# Patient Record
Sex: FEMALE | Race: WHITE
[De-identification: names, ages, dates, MRNs, and addresses within clinical notes are randomized per-mention and may not be internally consistent; named-entity substitution may affect disease eponyms.]

---

## 2019-08-03 ENCOUNTER — HOSPITAL ENCOUNTER (EMERGENCY)
Dept: HOSPITAL 47 - EC | Age: 39
Discharge: HOME | End: 2019-08-03
Payer: MEDICARE

## 2019-08-03 VITALS — TEMPERATURE: 98.4 F

## 2019-08-03 VITALS — SYSTOLIC BLOOD PRESSURE: 126 MMHG | HEART RATE: 85 BPM | DIASTOLIC BLOOD PRESSURE: 90 MMHG | RESPIRATION RATE: 16 BRPM

## 2019-08-03 DIAGNOSIS — Z87.19: ICD-10-CM

## 2019-08-03 DIAGNOSIS — N39.0: Primary | ICD-10-CM

## 2019-08-03 DIAGNOSIS — F17.200: ICD-10-CM

## 2019-08-03 DIAGNOSIS — Z91.048: ICD-10-CM

## 2019-08-03 DIAGNOSIS — Z98.84: ICD-10-CM
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
## 2019-08-03 DIAGNOSIS — S22.32XA: ICD-10-CM

## 2019-08-03 LAB
ALBUMIN SERPL-MCNC: 3.4 G/DL (ref 3.5–5)
ALP SERPL-CCNC: 100 U/L (ref 38–126)
ALT SERPL-CCNC: 50 U/L (ref 9–52)
AMYLASE SERPL-CCNC: 68 U/L (ref 30–110)
ANION GAP SERPL CALC-SCNC: 5 MMOL/L
APTT BLD: 24.4 SEC (ref 22–30)
AST SERPL-CCNC: 96 U/L (ref 14–36)
BASOPHILS # BLD AUTO: 0 K/UL (ref 0–0.2)
BASOPHILS NFR BLD AUTO: 1 %
BUN SERPL-SCNC: 5 MG/DL (ref 7–17)
CALCIUM SPEC-MCNC: 8.7 MG/DL (ref 8.4–10.2)
CHLORIDE SERPL-SCNC: 106 MMOL/L (ref 98–107)
CK SERPL-CCNC: 49 U/L (ref 30–135)
CO2 SERPL-SCNC: 27 MMOL/L (ref 22–30)
EOSINOPHIL # BLD AUTO: 0 K/UL (ref 0–0.7)
EOSINOPHIL NFR BLD AUTO: 1 %
ERYTHROCYTE [DISTWIDTH] IN BLOOD BY AUTOMATED COUNT: 3.69 M/UL (ref 3.8–5.4)
ERYTHROCYTE [DISTWIDTH] IN BLOOD: 16 % (ref 11.5–15.5)
GLUCOSE SERPL-MCNC: 93 MG/DL (ref 74–99)
HCT VFR BLD AUTO: 35.7 % (ref 34–46)
HGB BLD-MCNC: 11.4 GM/DL (ref 11.4–16)
INR PPP: 1 (ref ?–1.2)
LYMPHOCYTES # SPEC AUTO: 0.9 K/UL (ref 1–4.8)
LYMPHOCYTES NFR SPEC AUTO: 29 %
MCH RBC QN AUTO: 30.9 PG (ref 25–35)
MCHC RBC AUTO-ENTMCNC: 32 G/DL (ref 31–37)
MCV RBC AUTO: 96.5 FL (ref 80–100)
MONOCYTES # BLD AUTO: 0.3 K/UL (ref 0–1)
MONOCYTES NFR BLD AUTO: 10 %
NEUTROPHILS # BLD AUTO: 1.9 K/UL (ref 1.3–7.7)
NEUTROPHILS NFR BLD AUTO: 59 %
PH UR: 7 [PH] (ref 5–8)
PLATELET # BLD AUTO: 131 K/UL (ref 150–450)
POTASSIUM SERPL-SCNC: 3.9 MMOL/L (ref 3.5–5.1)
PROT SERPL-MCNC: 6.3 G/DL (ref 6.3–8.2)
PT BLD: 10.8 SEC (ref 9–12)
RBC UR QL: 5 /HPF (ref 0–5)
SODIUM SERPL-SCNC: 138 MMOL/L (ref 137–145)
SP GR UR: 1.01 (ref 1–1.03)
SQUAMOUS UR QL AUTO: 10 /HPF (ref 0–4)
UROBILINOGEN UR QL STRIP: <2 MG/DL (ref ?–2)
WBC # BLD AUTO: 3.2 K/UL (ref 3.8–10.6)

## 2019-08-03 PROCEDURE — 85025 COMPLETE CBC W/AUTO DIFF WBC: CPT

## 2019-08-03 PROCEDURE — 96376 TX/PRO/DX INJ SAME DRUG ADON: CPT

## 2019-08-03 PROCEDURE — 94640 AIRWAY INHALATION TREATMENT: CPT

## 2019-08-03 PROCEDURE — 99285 EMERGENCY DEPT VISIT HI MDM: CPT

## 2019-08-03 PROCEDURE — 96375 TX/PRO/DX INJ NEW DRUG ADDON: CPT

## 2019-08-03 PROCEDURE — 85730 THROMBOPLASTIN TIME PARTIAL: CPT

## 2019-08-03 PROCEDURE — 83690 ASSAY OF LIPASE: CPT

## 2019-08-03 PROCEDURE — 74177 CT ABD & PELVIS W/CONTRAST: CPT

## 2019-08-03 PROCEDURE — 96374 THER/PROPH/DIAG INJ IV PUSH: CPT

## 2019-08-03 PROCEDURE — 80053 COMPREHEN METABOLIC PANEL: CPT

## 2019-08-03 PROCEDURE — 82550 ASSAY OF CK (CPK): CPT

## 2019-08-03 PROCEDURE — 85610 PROTHROMBIN TIME: CPT

## 2019-08-03 PROCEDURE — 83605 ASSAY OF LACTIC ACID: CPT

## 2019-08-03 PROCEDURE — 96361 HYDRATE IV INFUSION ADD-ON: CPT

## 2019-08-03 PROCEDURE — 36415 COLL VENOUS BLD VENIPUNCTURE: CPT

## 2019-08-03 PROCEDURE — 87086 URINE CULTURE/COLONY COUNT: CPT

## 2019-08-03 PROCEDURE — 81001 URINALYSIS AUTO W/SCOPE: CPT

## 2019-08-03 PROCEDURE — 71046 X-RAY EXAM CHEST 2 VIEWS: CPT

## 2019-08-03 PROCEDURE — 82150 ASSAY OF AMYLASE: CPT

## 2019-08-03 NOTE — XR
EXAMINATION TYPE: XR chest 2V

 

DATE OF EXAM: 8/3/2019

 

COMPARISON: NONE

 

HISTORY: Cough.

 

TECHNIQUE:  Frontal and lateral views of the chest are obtained.

 

FINDINGS:  There is no focal air space opacity, pleural effusion, or pneumothorax seen.  The cardiac 
silhouette size is within normal limits. Ossification of shoulder level could reflect product of calc
ific rotator cuff tendinitis. Cholecystectomy clips are suspected on lateral view.

 

IMPRESSION:  No suspicious acute pulmonary process.

## 2019-08-03 NOTE — ED
Abdominal Pain HPI





- General


Chief Complaint: Abdominal Pain


Stated Complaint: abd pain


Time Seen by Provider: 19 13:42


Source: patient, RN notes reviewed


Mode of arrival: wheelchair


Limitations: no limitations





- History of Present Illness


Initial Comments: 





This a 38-year-old female presents emergency Department chief complaint of left-

sided abdominal pain.  Patient states that he started a few days ago she states 

initially fell into a chair.  Patient states that it feels exactly like when she

had pancreatitis in the past.  Patient does admit to nausea vomiting no.  

patient states that she's had slight loose stools no profound diarrhea or co

nstipation no melena or hematochezia.  patient states that she has no current 

chest pain or shortness of breath.  patient states the pain is just unbearable 

at this time.  she does admit that she does use alcohol 3 times a week.  patient

reports no fevers or chills.





- Related Data


                                  Previous Rx's











 Medication  Instructions  Recorded


 


Sulfamethox-Tmp 800-160Mg [Bactrim 1 each PO Q12HR #14 tab 19





Ds]  











                                    Allergies











Allergy/AdvReac Type Severity Reaction Status Date / Time


 


SILK TAPE Allergy  Unknown Uncoded 19 13:38














Review of Systems


ROS Statement: 


Those systems with pertinent positive or pertinent negative responses have been 

documented in the HPI.





ROS Other: All systems not noted in ROS Statement are negative.





Past Medical History


History of Any Multi-Drug Resistant Organisms: None Reported


Past Surgical History: Bariatric Surgery,  Section, Orthopedic Surgery


Past Psychological History: Depression


Smoking Status: Current every day smoker


Past Alcohol Use History: Occasional


Past Drug Use History: Cocaine





General Exam


Limitations: no limitations


General appearance: alert, in no apparent distress


Head exam: Present: atraumatic, normocephalic, normal inspection


Eye exam: Present: normal appearance, PERRL, EOMI.  Absent: scleral icterus, con

junctival injection, periorbital swelling


Respiratory exam: Present: normal lung sounds bilaterally.  Absent: respiratory 

distress, wheezes, rales, rhonchi, stridor


Cardiovascular Exam: Present: regular rate, normal rhythm, normal heart sounds. 

Absent: systolic murmur, diastolic murmur, rubs, gallop, clicks


GI/Abdominal exam: Present: soft, tenderness (Moderate left-sided abdominal 

tenderness), normal bowel sounds.  Absent: distended, guarding, rebound, rigid


Back exam: Present: full ROM, CVA tenderness (L).  Absent: tenderness, CVA 

tenderness (R)


Neurological exam: Present: alert, oriented X3, CN II-XII intact, reflexes 

normal.  Absent: motor sensory deficit


Skin exam: Present: warm, dry, intact, normal color.  Absent: rash





Course


                                   Vital Signs











  19





  13:34 14:59 15:04


 


Temperature 98.4 F  


 


Pulse Rate 98 90 92


 


Respiratory 16  





Rate   


 


Blood Pressure 105/76  


 


O2 Sat by Pulse 97  





Oximetry   














  19





  15:24


 


Temperature 


 


Pulse Rate 


 


Respiratory 18





Rate 


 


Blood Pressure 


 


O2 Sat by Pulse 





Oximetry 














Medical Decision Making





- Medical Decision Making





38-year-old female presented from it with chief complaint of left-sided 

abdominal and rib pain.  Patient rib fracture on CT when I reviewed it.  Patient

has also evidence urinary tract infection no edematous changes of the kidney 

concerning for pyelonephritis.  Patient will be discharged with antibiotics.  

Return parameters were discussed.  Patient states she has pain meds at home.





- Lab Data


Result diagrams: 


                                 19 14:29





                                 19 14:29


                                   Lab Results











  19 Range/Units





  14:29 14:29 14:29 


 


WBC   3.2 L   (3.8-10.6)  k/uL


 


RBC   3.69 L   (3.80-5.40)  m/uL


 


Hgb   11.4   (11.4-16.0)  gm/dL


 


Hct   35.7   (34.0-46.0)  %


 


MCV   96.5   (80.0-100.0)  fL


 


MCH   30.9   (25.0-35.0)  pg


 


MCHC   32.0   (31.0-37.0)  g/dL


 


RDW   16.0 H   (11.5-15.5)  %


 


Plt Count   131 L   (150-450)  k/uL


 


Neutrophils %   59   %


 


Lymphocytes %   29   %


 


Monocytes %   10   %


 


Eosinophils %   1   %


 


Basophils %   1   %


 


Neutrophils #   1.9   (1.3-7.7)  k/uL


 


Lymphocytes #   0.9 L   (1.0-4.8)  k/uL


 


Monocytes #   0.3   (0-1.0)  k/uL


 


Eosinophils #   0.0   (0-0.7)  k/uL


 


Basophils #   0.0   (0-0.2)  k/uL


 


Anisocytosis   Slight   


 


PT     (9.0-12.0)  sec


 


INR     (<1.2)  


 


APTT     (22.0-30.0)  sec


 


Sodium  138    (137-145)  mmol/L


 


Potassium  3.9    (3.5-5.1)  mmol/L


 


Chloride  106    ()  mmol/L


 


Carbon Dioxide  27    (22-30)  mmol/L


 


Anion Gap  5    mmol/L


 


BUN  5 L    (7-17)  mg/dL


 


Creatinine  0.49 L    (0.52-1.04)  mg/dL


 


Est GFR (CKD-EPI)AfAm  >90    (>60 ml/min/1.73 sqM)  


 


Est GFR (CKD-EPI)NonAf  >90    (>60 ml/min/1.73 sqM)  


 


Glucose  93    (74-99)  mg/dL


 


Plasma Lactic Acid Fermin    0.9  (0.7-2.0)  mmol/L


 


Calcium  8.7    (8.4-10.2)  mg/dL


 


Total Bilirubin  0.6    (0.2-1.3)  mg/dL


 


AST  96 H    (14-36)  U/L


 


ALT  50    (9-52)  U/L


 


Alkaline Phosphatase  100    ()  U/L


 


Creatine Kinase  49    ()  U/L


 


Total Protein  6.3    (6.3-8.2)  g/dL


 


Albumin  3.4 L    (3.5-5.0)  g/dL


 


Amylase  68    ()  U/L


 


Lipase  313 H    ()  U/L


 


Urine Color     


 


Urine Appearance     (Clear)  


 


Urine pH     (5.0-8.0)  


 


Ur Specific Gravity     (1.001-1.035)  


 


Urine Protein     (Negative)  


 


Urine Glucose (UA)     (Negative)  


 


Urine Ketones     (Negative)  


 


Urine Blood     (Negative)  


 


Urine Nitrite     (Negative)  


 


Urine Bilirubin     (Negative)  


 


Urine Urobilinogen     (<2.0)  mg/dL


 


Ur Leukocyte Esterase     (Negative)  


 


Urine RBC     (0-5)  /hpf


 


Urine WBC     (0-5)  /hpf


 


Ur Squamous Epith Cells     (0-4)  /hpf


 


Amorphous Sediment     (None)  /hpf


 


Urine Mucus     (None)  /hpf














  19 Range/Units





  14:29 15:26 


 


WBC    (3.8-10.6)  k/uL


 


RBC    (3.80-5.40)  m/uL


 


Hgb    (11.4-16.0)  gm/dL


 


Hct    (34.0-46.0)  %


 


MCV    (80.0-100.0)  fL


 


MCH    (25.0-35.0)  pg


 


MCHC    (31.0-37.0)  g/dL


 


RDW    (11.5-15.5)  %


 


Plt Count    (150-450)  k/uL


 


Neutrophils %    %


 


Lymphocytes %    %


 


Monocytes %    %


 


Eosinophils %    %


 


Basophils %    %


 


Neutrophils #    (1.3-7.7)  k/uL


 


Lymphocytes #    (1.0-4.8)  k/uL


 


Monocytes #    (0-1.0)  k/uL


 


Eosinophils #    (0-0.7)  k/uL


 


Basophils #    (0-0.2)  k/uL


 


Anisocytosis    


 


PT  10.8   (9.0-12.0)  sec


 


INR  1.0   (<1.2)  


 


APTT  24.4   (22.0-30.0)  sec


 


Sodium    (137-145)  mmol/L


 


Potassium    (3.5-5.1)  mmol/L


 


Chloride    ()  mmol/L


 


Carbon Dioxide    (22-30)  mmol/L


 


Anion Gap    mmol/L


 


BUN    (7-17)  mg/dL


 


Creatinine    (0.52-1.04)  mg/dL


 


Est GFR (CKD-EPI)AfAm    (>60 ml/min/1.73 sqM)  


 


Est GFR (CKD-EPI)NonAf    (>60 ml/min/1.73 sqM)  


 


Glucose    (74-99)  mg/dL


 


Plasma Lactic Acid Fermin    (0.7-2.0)  mmol/L


 


Calcium    (8.4-10.2)  mg/dL


 


Total Bilirubin    (0.2-1.3)  mg/dL


 


AST    (14-36)  U/L


 


ALT    (9-52)  U/L


 


Alkaline Phosphatase    ()  U/L


 


Creatine Kinase    ()  U/L


 


Total Protein    (6.3-8.2)  g/dL


 


Albumin    (3.5-5.0)  g/dL


 


Amylase    ()  U/L


 


Lipase    ()  U/L


 


Urine Color   Yellow  


 


Urine Appearance   Cloudy H  (Clear)  


 


Urine pH   7.0  (5.0-8.0)  


 


Ur Specific Gravity   1.008  (1.001-1.035)  


 


Urine Protein   Negative  (Negative)  


 


Urine Glucose (UA)   Negative  (Negative)  


 


Urine Ketones   Negative  (Negative)  


 


Urine Blood   Negative  (Negative)  


 


Urine Nitrite   Negative  (Negative)  


 


Urine Bilirubin   Negative  (Negative)  


 


Urine Urobilinogen   <2.0  (<2.0)  mg/dL


 


Ur Leukocyte Esterase   Large H  (Negative)  


 


Urine RBC   5  (0-5)  /hpf


 


Urine WBC   105 H  (0-5)  /hpf


 


Ur Squamous Epith Cells   10 H  (0-4)  /hpf


 


Amorphous Sediment   Rare H  (None)  /hpf


 


Urine Mucus   Rare H  (None)  /hpf














Disposition


Clinical Impression: 


 Rib fracture, UTI (urinary tract infection)





Disposition: HOME SELF-CARE


Condition: Stable


Instructions (If sedation given, give patient instructions):  Rib Fracture (ED)


Additional Instructions: 


Please return to the Emergency Department if symptoms worsen or any other 

concerns.


Prescriptions: 


Sulfamethox-Tmp 800-160Mg [Bactrim Ds] 1 each PO Q12HR #14 tab


Is patient prescribed a controlled substance at d/c from ED?: No


Referrals: 


Max Berg MD [Primary Care Provider] - 1-2 days


Time of Disposition: 17:00

## 2019-08-03 NOTE — CT
EXAMINATION TYPE: CT abdomen pelvis w con

 

DATE OF EXAM: 8/3/2019

 

HISTORY: LUQ pain. hx of pancreatitis. Left upper quadrant trauma.

 

CT DLP: 964mGycm  Automated Exposure Control for Dose Reduction was Utilized.

 

CONTRAST: 

CT scan of the abdomen and pelvis is performed without oral but with IV Contrast, patient injected wi
th 100 mL of Isovue 300.

 

COMPARISON: None.

 

FINDINGS:

 

LUNG BASES: Dependent atelectasis in both bases.

 

LIVER/GB: Liver is diffusely low dense consistent with marked fatty infiltration.

 

PANCREAS: Pancreas is normal in size without surrounding fluid or fat stranding.

 

SPLEEN: Spleen is enlarged at 13.5 cm long axis axial image 12.

 

ADRENALS: No significant abnormality is seen.

 

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 Symmetric cortical medullary uptake and exc
retion is seen.

 

BOWEL: Evaluation of bowel suboptimal secondary to lack of enteric contrast. Surgical changes from ga
stric bypass noted epigastric region. Additional surgical sutures seen in left upper quadrant bowel l
oop. No suspicious overall small or large bowel dilatation. Normal-appearing appendix from the cecum 
seen best on coronal image 55.

 

UTERUS/ADNEXA: Anteverted uterus is present. Left ovary. Normal size image 70 with suspected 2.5 cm t
hin-walled cyst anteriorly axial image 71. Right ovary not as well seen. Perhaps small amount of flui
d right pelvis coronal image 36.

 

LYMPH NODES: No greater than 1cm abdominal or pelvic lymph nodes are appreciated.

 

OSSEOUS STRUCTURES: No significant abnormality is seen.

 

OTHER: No significant additional abnormality is seen.

 

IMPRESSION: No CT evidence for complication related to acute pancreatitis. No significant acute findi
ng is seen to account for patient's clinical symptoms on this study.

## 2020-08-07 ENCOUNTER — HOSPITAL ENCOUNTER (EMERGENCY)
Dept: HOSPITAL 47 - EC | Age: 40
Discharge: OTHER INSTITUTION | End: 2020-08-07
Payer: MEDICARE

## 2020-08-07 VITALS — SYSTOLIC BLOOD PRESSURE: 114 MMHG | DIASTOLIC BLOOD PRESSURE: 83 MMHG | HEART RATE: 94 BPM | RESPIRATION RATE: 30 BRPM

## 2020-08-07 VITALS — TEMPERATURE: 98.5 F

## 2020-08-07 DIAGNOSIS — F17.200: ICD-10-CM

## 2020-08-07 DIAGNOSIS — Z91.048: ICD-10-CM

## 2020-08-07 DIAGNOSIS — Z79.891: ICD-10-CM

## 2020-08-07 DIAGNOSIS — F32.9: ICD-10-CM

## 2020-08-07 DIAGNOSIS — Z98.890: ICD-10-CM

## 2020-08-07 DIAGNOSIS — M00.822: ICD-10-CM

## 2020-08-07 DIAGNOSIS — Z79.899: ICD-10-CM

## 2020-08-07 DIAGNOSIS — A41.9: ICD-10-CM

## 2020-08-07 DIAGNOSIS — E87.2: ICD-10-CM

## 2020-08-07 DIAGNOSIS — T81.49XA: Primary | ICD-10-CM

## 2020-08-07 DIAGNOSIS — R00.0: ICD-10-CM

## 2020-08-07 DIAGNOSIS — T81.44XA: ICD-10-CM

## 2020-08-07 LAB
ALBUMIN SERPL-MCNC: 2.6 G/DL (ref 3.5–5)
ALP SERPL-CCNC: 159 U/L (ref 38–126)
ALT SERPL-CCNC: 17 U/L (ref 4–34)
ANION GAP SERPL CALC-SCNC: 7 MMOL/L
AST SERPL-CCNC: 48 U/L (ref 14–36)
BASOPHILS # BLD AUTO: 0 K/UL (ref 0–0.2)
BASOPHILS NFR BLD AUTO: 0 %
BUN SERPL-SCNC: 8 MG/DL (ref 7–17)
CALCIUM SPEC-MCNC: 8.1 MG/DL (ref 8.4–10.2)
CHLORIDE SERPL-SCNC: 110 MMOL/L (ref 98–107)
CO2 SERPL-SCNC: 20 MMOL/L (ref 22–30)
EOSINOPHIL # BLD AUTO: 0.1 K/UL (ref 0–0.7)
EOSINOPHIL NFR BLD AUTO: 2 %
ERYTHROCYTE [DISTWIDTH] IN BLOOD BY AUTOMATED COUNT: 4.14 M/UL (ref 3.8–5.4)
ERYTHROCYTE [DISTWIDTH] IN BLOOD: 17.7 % (ref 11.5–15.5)
GLUCOSE SERPL-MCNC: 148 MG/DL (ref 74–99)
HCT VFR BLD AUTO: 37 % (ref 34–46)
HGB BLD-MCNC: 11 GM/DL (ref 11.4–16)
LYMPHOCYTES # SPEC AUTO: 1.3 K/UL (ref 1–4.8)
LYMPHOCYTES NFR SPEC AUTO: 20 %
MCH RBC QN AUTO: 26.4 PG (ref 25–35)
MCHC RBC AUTO-ENTMCNC: 29.6 G/DL (ref 31–37)
MCV RBC AUTO: 89.2 FL (ref 80–100)
MONOCYTES # BLD AUTO: 0.3 K/UL (ref 0–1)
MONOCYTES NFR BLD AUTO: 5 %
NEUTROPHILS # BLD AUTO: 4.5 K/UL (ref 1.3–7.7)
NEUTROPHILS NFR BLD AUTO: 72 %
PLATELET # BLD AUTO: 166 K/UL (ref 150–450)
POTASSIUM SERPL-SCNC: 3 MMOL/L (ref 3.5–5.1)
PROT SERPL-MCNC: 5.5 G/DL (ref 6.3–8.2)
SODIUM SERPL-SCNC: 137 MMOL/L (ref 137–145)
WBC # BLD AUTO: 6.3 K/UL (ref 3.8–10.6)

## 2020-08-07 PROCEDURE — 36415 COLL VENOUS BLD VENIPUNCTURE: CPT

## 2020-08-07 PROCEDURE — 96366 THER/PROPH/DIAG IV INF ADDON: CPT

## 2020-08-07 PROCEDURE — 96368 THER/DIAG CONCURRENT INF: CPT

## 2020-08-07 PROCEDURE — 73080 X-RAY EXAM OF ELBOW: CPT

## 2020-08-07 PROCEDURE — 87040 BLOOD CULTURE FOR BACTERIA: CPT

## 2020-08-07 PROCEDURE — 96375 TX/PRO/DX INJ NEW DRUG ADDON: CPT

## 2020-08-07 PROCEDURE — 96365 THER/PROPH/DIAG IV INF INIT: CPT

## 2020-08-07 PROCEDURE — 99284 EMERGENCY DEPT VISIT MOD MDM: CPT

## 2020-08-07 PROCEDURE — 85025 COMPLETE CBC W/AUTO DIFF WBC: CPT

## 2020-08-07 PROCEDURE — 96376 TX/PRO/DX INJ SAME DRUG ADON: CPT

## 2020-08-07 PROCEDURE — 83605 ASSAY OF LACTIC ACID: CPT

## 2020-08-07 PROCEDURE — 80053 COMPREHEN METABOLIC PANEL: CPT

## 2020-08-07 RX ADMIN — HYDROMORPHONE HYDROCHLORIDE PRN MG: 1 INJECTION, SOLUTION INTRAMUSCULAR; INTRAVENOUS; SUBCUTANEOUS at 19:00

## 2020-08-07 RX ADMIN — HYDROMORPHONE HYDROCHLORIDE PRN MG: 1 INJECTION, SOLUTION INTRAMUSCULAR; INTRAVENOUS; SUBCUTANEOUS at 15:52

## 2020-08-07 NOTE — ED
Skin/Abscess/FB HPI





- General


Chief complaint: Skin/Abscess/Foreign Body


Stated complaint: Arm surgery, open surgical site


Time Seen by Provider: 20 14:20


Source: patient


Mode of arrival: wheelchair


Limitations: no limitations





- History of Present Illness


Initial comments: 


39-year-old feel presented for postoperative competition.  Patient states that 

she felt the end of  and broke her left elbow.  Patient states that she had 

an operation which needed internal fixation which was performed at an office in 

[place], by Dr. Corbett.  Patient states that she went to 1 week follow-

up and everything was okay.  She states she went to the two-week follow-up and 

was complaining of a small bump feeling as though there was an infection she st

ates there is redness and warmth. Patient states she has had chills and 

significantly increased pain since the . Patient states that she saw her PCP

once then a second time today who then sent her immediately to the ER. Patient 

states over the past few days the wounds of the elbow have become open, draining

yellow thick fluid and she can see the wires. Patient states she was able to 

slightly move elbow but not it is untolerable. Patient arrives with elevated HR.

BP stable. Patient denies fever, afebrile on arrival. Patient denies chest pain,

SOB. Patient appears uncomfortable on arrival.








- Related Data


                                Home Medications











 Medication  Instructions  Recorded  Confirmed


 


Cephalexin [Keflex] 500 mg PO Q6H 20


 


Gabapentin 800 mg PO TID 20


 


HYDROcodone/APAP 10-325MG [Norco 1 tab PO QID 20





]   


 


LORazepam [Ativan] 1 mg PO BID 20


 


Methocarbamol [Robaxin-750] 750 mg PO TID 20











                                    Allergies











Allergy/AdvReac Type Severity Reaction Status Date / Time


 


SILK TAPE Allergy  Unknown Uncoded 20 17:54














Review of Systems


ROS Statement: 


Those systems with pertinent positive or pertinent negative responses have been 

documented in the HPI.





ROS Other: All systems not noted in ROS Statement are negative.





Past Medical History


Past Medical History: Asthma, Renal Disease


Additional Past Medical History / Comment(s): horseshoe kidney


History of Any Multi-Drug Resistant Organisms: None Reported


Past Surgical History: Bariatric Surgery,  Section, Orthopedic Surgery


Additional Past Surgical History / Comment(s): (L) elbow surgery


Past Psychological History: Depression


Smoking Status: Current every day smoker


Past Alcohol Use History: Occasional


Past Drug Use History: Cocaine





General Exam





- General Exam Comments


Initial Comments: 


General:  The patient is awake and alert, crying holding left arm


Eye: +3 mm pupils are equal, round and reactive to light, extra-ocular movements

are intact.  No nystagmus.  There is normal conjunctiva bilaterally.  No signs 

of icterus.  


Ears, nose, mouth and throat:  There are moist mucous membranes and no oral 

lesions. 


Neck:  The neck is supple, there is no tenderness or JVD.  


Cardiovascular:  There is a regular rate and rhythm. No murmur, rub or gallop is

appreciated.


Respiratory:  Lungs are clear to auscultation, respirations are non-labored, 

breath sounds are equal.  No wheezes, stridor, rales, or rhonchi.


Musculoskeletal:  Normal ROM, no tenderness.  Strength 5/5. Sensation intact. 

Radial pulses equal bilaterally 2+.  


Neurological:  A&O x 3. CN II-XII intact grossly, There are no obvious motor or 

sensory deficits. Coordination appears grossly intact. Speech is normal.


Skin:  Skin is warm and dry and no rashes or lesions are noted. 


Psychiatric:  Cooperative, appropriate mood & affect, normal judgment.  





Sepsis diagnosed at 16:00





Limitations: no limitations





Course


                                   Vital Signs











  20





  14:07 15:58


 


Temperature 98.5 F 


 


Pulse Rate 134 H 94


 


Respiratory 16 30 H





Rate  


 


Blood Pressure 141/81 114/83


 


O2 Sat by Pulse 98 97





Oximetry  














Medical Decision Making





- Medical Decision Making


39-year-old female presents emergency department for left elbow postoperative 

complication. Surgery , Dr. Corbett.  There is significant dehiscence t

here is a 7 x 3 cm area open at the elbow joint directly with purulent drainage 

and metal exposure, just distal along the proximal forearm there is a 1x3cm open

area with wire exposure. Patient has significant swelling warmth/redness of 

area/pain. Patient arrived with heart rate of 134.  Lactic E back at 4.0 patient

has leukocytosis.  Concern for sepsis.  Patient had 2 g Rocephin initiated in 

the emergency department as well as weight-based vancomycin as dosed by 

pharmacy. Patient BP remained stable. Patient initially felt unhappy with care 

by Dr. Corbett, was going to transfer to [facility] for second opinion. 

Patient then changed mind and wanted to go to Dr. Corbett. We contacted Dr. Corbett 

partner, Dr Love who contacted Dr. Corebtt recommending transfer to [facility]. Patient case discussed with accepting ER physician Dr. Seo who accepted

transfer ER t[facility] ER. Patient appears stable for transfer and is agreeable.\





Case and management was discussed in detail by attending provider Dr. Nickerson 

who was agreeable to transfer/care plan/antibiotic regime.








- Lab Data


Result diagrams: 


                                 20 15:50





                                 20 15:50


                                   Lab Results











  20 Range/Units





  15:50 15:50 15:50 


 


WBC  6.3    (3.8-10.6)  k/uL


 


RBC  4.14    (3.80-5.40)  m/uL


 


Hgb  11.0 L    (11.4-16.0)  gm/dL


 


Hct  37.0    (34.0-46.0)  %


 


MCV  89.2    (80.0-100.0)  fL


 


MCH  26.4    (25.0-35.0)  pg


 


MCHC  29.6 L    (31.0-37.0)  g/dL


 


RDW  17.7 H    (11.5-15.5)  %


 


Plt Count  166    (150-450)  k/uL


 


Neutrophils %  72    %


 


Lymphocytes %  20    %


 


Monocytes %  5    %


 


Eosinophils %  2    %


 


Basophils %  0    %


 


Neutrophils #  4.5    (1.3-7.7)  k/uL


 


Lymphocytes #  1.3    (1.0-4.8)  k/uL


 


Monocytes #  0.3    (0-1.0)  k/uL


 


Eosinophils #  0.1    (0-0.7)  k/uL


 


Basophils #  0.0    (0-0.2)  k/uL


 


Hypochromasia  Marked    


 


Anisocytosis  Slight    


 


Sodium   137   (137-145)  mmol/L


 


Potassium   3.0 L   (3.5-5.1)  mmol/L


 


Chloride   110 H   ()  mmol/L


 


Carbon Dioxide   20 L   (22-30)  mmol/L


 


Anion Gap   7   mmol/L


 


BUN   8   (7-17)  mg/dL


 


Creatinine   0.46 L   (0.52-1.04)  mg/dL


 


Est GFR (CKD-EPI)AfAm   >90   (>60 ml/min/1.73 sqM)  


 


Est GFR (CKD-EPI)NonAf   >90   (>60 ml/min/1.73 sqM)  


 


Glucose   148 H   (74-99)  mg/dL


 


Plasma Lactic Acid Fermin    4.4 H*  (0.7-2.0)  mmol/L


 


Calcium   8.1 L   (8.4-10.2)  mg/dL


 


Total Bilirubin   0.3   (0.2-1.3)  mg/dL


 


AST   48 H   (14-36)  U/L


 


ALT   17   (4-34)  U/L


 


Alkaline Phosphatase   159 H   ()  U/L


 


Total Protein   5.5 L   (6.3-8.2)  g/dL


 


Albumin   2.6 L   (3.5-5.0)  g/dL














Disposition


Clinical Impression: 


 Post-operative infection, Septic joint of left elbow, Sepsis, Lactic acidosis





Disposition: OTHER INSTITUTION NOT DEFINED


Condition: Stable


Is patient prescribed a controlled substance at d/c from ED?: No


Referrals: 


Max Berg MD [Primary Care Provider] - 1-2 days


Time of Disposition: 18:20





- Out of Hospital Transfer - Req. Specs


Out of Hospital Transfer - Requested Specifics: Other Emergency Center ([facility])

## 2020-08-07 NOTE — XR
EXAMINATION TYPE: XR elbow complete LT

 

DATE OF EXAM: 8/7/2020

 

CLINICAL HISTORY: Elbow surgery one month earlier with pain and swelling, open wound. Fall injury rep
orted.

 

TECHNIQUE:  Frontal, lateral and oblique images of the left elbow are attempted.

 

COMPARISON: None

 

FINDINGS:  There is medial aspect fixating plate comminuted fracture distal humerus. Moderate adjacen
t subcutaneous edema. Abnormal curvilinear air extends to level of the metallic plate. This is more t
zuniga expected after surgery one month earlier.

 

2 K wires through transverse intra-articular fracture of the olecranon. Additional cerclage fixating 
wire which distal portion has been distracted from the proximal ulna.

 

Additional moderate subcutaneous edema along the dorsal surface distal humeral level with foci of air
.

 

IMPRESSION: As above. Soft tissue infection likely extends to involve the distal humerus and surgical
 hardware. Advise orthopedic referral.